# Patient Record
Sex: FEMALE | URBAN - METROPOLITAN AREA
[De-identification: names, ages, dates, MRNs, and addresses within clinical notes are randomized per-mention and may not be internally consistent; named-entity substitution may affect disease eponyms.]

---

## 2020-04-22 ENCOUNTER — APPOINTMENT (RX ONLY)
Dept: URBAN - METROPOLITAN AREA CLINIC 4 | Facility: CLINIC | Age: 55
Setting detail: DERMATOLOGY
End: 2020-04-22

## 2020-04-22 DIAGNOSIS — M71 OTHER BURSOPATHIES: ICD-10-CM

## 2020-04-22 PROBLEM — D48.5 NEOPLASM OF UNCERTAIN BEHAVIOR OF SKIN: Status: ACTIVE | Noted: 2020-04-22

## 2020-04-22 PROCEDURE — ? DIAGNOSIS COMMENT

## 2020-04-22 PROCEDURE — 99201: CPT | Mod: 95

## 2020-04-22 ASSESSMENT — LOCATION SIMPLE DESCRIPTION DERM: LOCATION SIMPLE: RIGHT GREAT TOE

## 2020-04-22 ASSESSMENT — LOCATION ZONE DERM: LOCATION ZONE: TOE

## 2020-04-22 ASSESSMENT — LOCATION DETAILED DESCRIPTION DERM: LOCATION DETAILED: RIGHT DORSAL GREAT TOE

## 2020-04-22 NOTE — HPI: SKIN LESION
What Type Of Note Output Would You Prefer (Optional)?: Bullet Format
How Severe Is Your Skin Lesion?: mild
Has Your Skin Lesion Been Treated?: not been treated
Is This A New Presentation, Or A Follow-Up?: Growth
Additional History: Telederm appt consent on file and is aware this does not replace a physical exam.  States only treating with OTC sal acid corn removal for a couple of days.

## 2020-04-22 NOTE — PROCEDURE: DIAGNOSIS COMMENT
Detail Level: Simple
Comment: This visit done by telehealth via zoom anu. *virtual exam done in real-time with patient using both audio and visual.\\nVerbal and signed consent obtained. Patient aware this does not replace a physical in-office exam.
Comment: Favor digital mucous cyst. Pt states lesion has been stable the past few months. Plan to schedule office visit to further assess/ treat. Pt will call sooner with any interval change/growth.

## 2020-06-02 ENCOUNTER — APPOINTMENT (RX ONLY)
Dept: URBAN - METROPOLITAN AREA CLINIC 4 | Facility: CLINIC | Age: 55
Setting detail: DERMATOLOGY
End: 2020-06-02

## 2020-06-02 DIAGNOSIS — L68.0 HIRSUTISM: ICD-10-CM

## 2020-06-02 DIAGNOSIS — M71 OTHER BURSOPATHIES: ICD-10-CM | Status: IMPROVED

## 2020-06-02 DIAGNOSIS — Z85.828 PERSONAL HISTORY OF OTHER MALIGNANT NEOPLASM OF SKIN: ICD-10-CM

## 2020-06-02 PROBLEM — M71.371 OTHER BURSAL CYST, RIGHT ANKLE AND FOOT: Status: ACTIVE | Noted: 2020-06-02

## 2020-06-02 PROCEDURE — ? COUNSELING

## 2020-06-02 PROCEDURE — ? DIAGNOSIS COMMENT

## 2020-06-02 PROCEDURE — 99213 OFFICE O/P EST LOW 20 MIN: CPT

## 2020-06-02 PROCEDURE — ? PRESCRIPTION

## 2020-06-02 RX ORDER — EFLORNITHINE HYDROCHLORIDE 139 MG/G
CREAM TOPICAL BID
Qty: 1 | Refills: 2 | COMMUNITY
Start: 2020-06-02

## 2020-06-02 RX ADMIN — EFLORNITHINE HYDROCHLORIDE: 139 CREAM TOPICAL at 00:00

## 2020-06-02 ASSESSMENT — LOCATION ZONE DERM
LOCATION ZONE: TOE
LOCATION ZONE: FACE

## 2020-06-02 ASSESSMENT — LOCATION SIMPLE DESCRIPTION DERM
LOCATION SIMPLE: RIGHT GREAT TOE
LOCATION SIMPLE: CHIN

## 2020-06-02 ASSESSMENT — LOCATION DETAILED DESCRIPTION DERM
LOCATION DETAILED: LEFT CHIN
LOCATION DETAILED: RIGHT DORSAL GREAT TOE

## 2020-06-02 NOTE — PROCEDURE: DIAGNOSIS COMMENT
Comment: Pt states significant improvement since last visit. She states lesion “popped” 2 weeks ago and produced a clear, gelatinous material. Today, punctured with 11 blade, but no addl drainage expressed. Pt instructed to RTC PRN. \\n*will recheck site at [facility].
Detail Level: Simple
Comment: Recommend [facility]- pt will schedule. Denies lesions of concern today.
Detail Level: Zone

## 2020-06-02 NOTE — HPI: UNWANTED HAIR
How Did Your Unwanted Hair Appear?: gradual in onset
Additional History: Pt interested in [employer].

## 2020-06-02 NOTE — HPI: SKIN LESION
How Severe Is Your Skin Lesion?: mild
Has Your Skin Lesion Been Treated?: not been treated
Is This A New Presentation, Or A Follow-Up?: Skin Lesion
Additional History: Area popped x2 weeks ago with a gelatinous material released from lesion / pt was seen for telederm visit previously